# Patient Record
(demographics unavailable — no encounter records)

---

## 2024-12-16 NOTE — PLAN
[FreeTextEntry1] : 29-year-old female here today for annual exam.  -Recent mammo and US given sisters hx, follow up studies scheduled for later this week. Discussed breast MRI may also be beneficial as additional screening tool, will order for June 2025 pending mammo and US results. Option for referral to breast surgeon discussed. [place] believes her sister underwent genetic testing but will confirm.  -Normal exam, pap collected today. STD testing offered, patient consents to testing of swab, declines lab work. -Gardasil vaccine discussed and offered, deferred. -Renewed sprintec.  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 -Follow up in 1yr or sooner as needed.

## 2024-12-16 NOTE — HISTORY OF PRESENT ILLNESS
[Patient reported mammogram was abnormal] : Patient reported mammogram was abnormal [Patient reported breast sonogram was abnormal] : Patient reported breast sonogram was abnormal [Patient reported PAP Smear was normal] : Patient reported PAP Smear was normal [HIV Test offered] : HIV Test offered [Syphilis test offered] : Syphilis test offered [Gonorrhea test offered] : Gonorrhea test offered [Chlamydia test offered] : Chlamydia test offered [Trichomonas test offered] : Trichomonas test offered [HPV test offered] : HPV test offered [Hepatitis B test offered] : Hepatitis B test offered [Hepatitis C test offered] : Hepatitis C test offered [Y] : Patient uses contraception [Oral Contraceptive] : uses oral contraception pills [N] : Patient denies prior pregnancies [TextBox_4] : 29-year-old female here today for annual. Hx of abnormal pap previously and underwent colposcopy in the past. Sister with breast cancer diagnoses age 36, patient underwent bilateral screening mammogram and US earlier this month, was recalled for asymmetry, follow up studies scheduled for 12/18/24.  [Mammogramdate] : 12/5/24 [BreastSonogramDate] : 12/5/24 [PapSmeardate] : 12/14/23 [LMPDate] : 12/5/24 [PGHxTotal] : 0

## 2024-12-16 NOTE — HISTORY OF PRESENT ILLNESS
[Patient reported breast sonogram was abnormal] : Patient reported breast sonogram was abnormal [Patient reported mammogram was abnormal] : Patient reported mammogram was abnormal [Patient reported PAP Smear was normal] : Patient reported PAP Smear was normal [HIV Test offered] : HIV Test offered [Syphilis test offered] : Syphilis test offered [Gonorrhea test offered] : Gonorrhea test offered [Chlamydia test offered] : Chlamydia test offered [Trichomonas test offered] : Trichomonas test offered [HPV test offered] : HPV test offered [Hepatitis B test offered] : Hepatitis B test offered [Hepatitis C test offered] : Hepatitis C test offered [Y] : Patient uses contraception [Oral Contraceptive] : uses oral contraception pills [N] : Patient denies prior pregnancies [TextBox_4] : 29-year-old female here today for annual. Hx of abnormal pap previously and underwent colposcopy in the past. Sister with breast cancer diagnoses age 36, patient underwent bilateral screening mammogram and US earlier this month, was recalled for asymmetry, follow up studies scheduled for 12/18/24.  [Mammogramdate] : 12/5/24 [PapSmeardate] : 12/14/23 [BreastSonogramDate] : 12/5/24 [LMPDate] : 12/5/24 [PGHxTotal] : 0

## 2024-12-16 NOTE — PHYSICAL EXAM
[Appropriately responsive] : appropriately responsive [Alert] : alert [No Acute Distress] : no acute distress [No Lymphadenopathy] : no lymphadenopathy [Soft] : soft [Non-tender] : non-tender [Oriented x3] : oriented x3 [Examination Of The Breasts] : a normal appearance [Rt > Lt] : the right breast was larger than the left [No Masses] : no breast masses were palpable [Labia Majora] : normal [Labia Minora] : normal [Normal] : normal [Uterine Adnexae] : normal